# Patient Record
(demographics unavailable — no encounter records)

---

## 2017-02-24 NOTE — MAMMOGRAPHY REPORT
BILATERAL DIGITAL SCREENING MAMMOGRAM WITH CAD: 2/24/2017

CLINICAL HISTORY: Routine screening.  Patient has no complaints.  





TECHNIQUE:  Current study was also evaluated with a Computer Aided Detection (CAD) system.  Bilatera
l CC and MLO views were obtained.



COMPARISON: Prior outside mammograms dated 6/13/2014, 5/30/2012, 11/30/2010.  



BREAST COMPOSITION:  There are scattered areas of fibroglandular density in both breasts.  



FINDINGS:  No suspicious masses, calcifications, or areas of architectural distortion are noted in e
ither breast. There has been no significant interval change compared to prior exams. Scattered bilat
eral benign-appearing calcifications are not significantly changed.  





IMPRESSION:  ACR BI-RADS CATEGORY 2: BENIGN

There is no mammographic evidence of malignancy. A 1 year screening mammogram is recommended.  The p
atient will receive written notification of the results.  





Approximately 10% of breast cancers are not detected with mammography. A negative mammographic repor
t should not delay biopsy if a clinically suggestive mass is present.



Liil Haas M.D.          

ah/:2/24/2017 14:52:36  



Imaging Technologist: Soo Burks RT(R)(M)(BD), [facility]

letter sent: Normal 1/2  

BI-RADS Code: ACR BI-RADS Category 2: Benign

## 2018-02-27 NOTE — MAMMOGRAPHY REPORT
BILATERAL DIGITAL SCREENING MAMMOGRAM TOMOSYNTHESIS WITH CAD: 2/26/2018

CLINICAL HISTORY: Routine screening.  Patient has no complaints.  





TECHNIQUE:  Breast tomosynthesis in addition to standard 2D mammography was performed. Current study 
was also evaluated with a Computer Aided Detection (CAD) system.  



COMPARISON: Comparison is made to exams dated:  2/24/2017 mammogram - [facility], 1
1/30/2010 mammogram, 5/30/2012 mammogram, and 6/13/2014 mammogram.   



BREAST COMPOSITION:  There are scattered areas of fibroglandular density in both breasts.  



FINDINGS: There is a stable benign mass in the superior posterior right breast on the MLO view, that 
is stable in size dating back to at least 11/30/2010, therefore likely benign.  There are diffuse nayla
ateral benign rim calcifications and round microcalcifications in the breasts.  Stable metallic biops
y marker in the upper outer posterior right breast.  No new suspicious mass, architectural distortion
 or cluster of microcalcifications is seen.  



IMPRESSION:  ACR BI-RADS CATEGORY 1: NEGATIVE

There is no mammographic evidence of malignancy. A 1 year screening mammogram is recommended.  The pa
tient will receive written notification of the results.  





Approximately 10% of breast cancers are not detected with mammography. A negative mammographic report
 should not delay biopsy if a clinically suggestive mass is present.



Esther Friedman M.D.          

ay/:2/27/2018 07:39:45  



Imaging Technologist: Pema RIGGS)([place] NENA), [facility]

letter sent: Normal 1/2  

BI-RADS Code: ACR BI-RADS Category 1: Negative